# Patient Record
Sex: MALE | Race: WHITE | URBAN - METROPOLITAN AREA
[De-identification: names, ages, dates, MRNs, and addresses within clinical notes are randomized per-mention and may not be internally consistent; named-entity substitution may affect disease eponyms.]

---

## 2023-03-08 ENCOUNTER — APPOINTMENT (RX ONLY)
Dept: URBAN - METROPOLITAN AREA CLINIC 328 | Facility: CLINIC | Age: 43
Setting detail: DERMATOLOGY
End: 2023-03-08

## 2023-03-08 DIAGNOSIS — L30.4 ERYTHEMA INTERTRIGO: ICD-10-CM

## 2023-03-08 DIAGNOSIS — A63.0 ANOGENITAL (VENEREAL) WARTS: ICD-10-CM

## 2023-03-08 PROCEDURE — ? COUNSELING

## 2023-03-08 PROCEDURE — ? PRESCRIPTION

## 2023-03-08 PROCEDURE — ? PRESCRIPTION MEDICATION MANAGEMENT

## 2023-03-08 PROCEDURE — 99203 OFFICE O/P NEW LOW 30 MIN: CPT | Mod: 25

## 2023-03-08 PROCEDURE — 17110 DESTRUCTION B9 LES UP TO 14: CPT

## 2023-03-08 PROCEDURE — ? BENIGN DESTRUCTION

## 2023-03-08 RX ORDER — HYDROCORTISONE 25 MG/G
CREAM TOPICAL
Qty: 30 | Refills: 0 | Status: ERX | COMMUNITY
Start: 2023-03-08

## 2023-03-08 RX ORDER — ECONAZOLE NITRATE 10 MG/G
CREAM TOPICAL
Qty: 85 | Refills: 3 | Status: ERX | COMMUNITY
Start: 2023-03-08

## 2023-03-08 RX ADMIN — HYDROCORTISONE: 25 CREAM TOPICAL at 00:00

## 2023-03-08 RX ADMIN — ECONAZOLE NITRATE: 10 CREAM TOPICAL at 00:00

## 2023-03-08 ASSESSMENT — LOCATION SIMPLE DESCRIPTION DERM
LOCATION SIMPLE: PENIS
LOCATION SIMPLE: RIGHT AXILLARY VAULT
LOCATION SIMPLE: SCROTUM
LOCATION SIMPLE: GROIN
LOCATION SIMPLE: LEFT AXILLARY VAULT

## 2023-03-08 ASSESSMENT — LOCATION ZONE DERM
LOCATION ZONE: PENIS
LOCATION ZONE: GENITALIA
LOCATION ZONE: AXILLAE
LOCATION ZONE: TRUNK

## 2023-03-08 ASSESSMENT — TOTAL NUMBER OF CONDYLOMA: # OF LESIONS?: 3

## 2023-03-08 ASSESSMENT — LOCATION DETAILED DESCRIPTION DERM
LOCATION DETAILED: LEFT AXILLARY VAULT
LOCATION DETAILED: RIGHT AXILLARY VAULT
LOCATION DETAILED: BASE OF THE PENIS
LOCATION DETAILED: LEFT ANTERIOR SCROTUM
LOCATION DETAILED: SUPRAPUBIC SKIN

## 2023-03-08 NOTE — PROCEDURE: PRESCRIPTION MEDICATION MANAGEMENT
Initiate Treatment: Econozole bid for two weeks \\nHydrocortisone bid Monday , Wednesday,Friday
Detail Level: Zone
Render In Strict Bullet Format?: No

## 2023-03-08 NOTE — PROCEDURE: BENIGN DESTRUCTION
Render Note In Bullet Format When Appropriate: No
Detail Level: Detailed
Medical Necessity Information: It is in your best interest to select a reason for this procedure from the list below. All of these items fulfill various CMS LCD requirements except the new and changing color options.
Medical Necessity Clause: This procedure was medically necessary because the lesions that were treated were:
Consent: The patient's consent was obtained including but not limited to risks of crusting, scabbing, blistering, scarring, darker or lighter pigmentary change, recurrence, incomplete removal and infection.
Post-Care Instructions: I reviewed with the patient in detail post-care instructions. Patient is to wear sunprotection, and avoid picking at any of the treated lesions. Pt may apply Vaseline to crusted or scabbing areas.
Anesthesia Volume In Cc: 0.5
Treatment Number (Will Not Render If 0): 0

## 2023-03-22 ENCOUNTER — APPOINTMENT (RX ONLY)
Dept: URBAN - METROPOLITAN AREA CLINIC 334 | Facility: CLINIC | Age: 43
Setting detail: DERMATOLOGY
End: 2023-03-22

## 2023-03-22 DIAGNOSIS — A63.0 ANOGENITAL (VENEREAL) WARTS: ICD-10-CM | Status: RESOLVING

## 2023-03-22 DIAGNOSIS — L30.4 ERYTHEMA INTERTRIGO: ICD-10-CM | Status: RESOLVING

## 2023-03-22 PROCEDURE — ? PRESCRIPTION MEDICATION MANAGEMENT

## 2023-03-22 PROCEDURE — 99213 OFFICE O/P EST LOW 20 MIN: CPT

## 2023-03-22 PROCEDURE — ? COUNSELING

## 2023-03-22 PROCEDURE — ? IN-HOUSE DISPENSING PHARMACY

## 2023-03-22 ASSESSMENT — LOCATION ZONE DERM
LOCATION ZONE: PENIS
LOCATION ZONE: PENIS
LOCATION ZONE: AXILLAE

## 2023-03-22 ASSESSMENT — LOCATION SIMPLE DESCRIPTION DERM
LOCATION SIMPLE: LEFT AXILLARY VAULT
LOCATION SIMPLE: PENIS
LOCATION SIMPLE: RIGHT AXILLARY VAULT
LOCATION SIMPLE: PENIS

## 2023-03-22 ASSESSMENT — LOCATION DETAILED DESCRIPTION DERM
LOCATION DETAILED: LEFT AXILLARY VAULT
LOCATION DETAILED: DORSAL GLANS
LOCATION DETAILED: DORSAL GLANS
LOCATION DETAILED: RIGHT AXILLARY VAULT

## 2023-03-22 NOTE — PROCEDURE: IN-HOUSE DISPENSING PHARMACY
Product 20 Units Dispensed: 0
Product 75 Unit Type: mg
Render Product Pricing In Note: Yes
Product 13 Amount/Unit (Numbers Only): 1
Product 5 Application Directions: apply to face at night
Name Of Product 10: Fungal Dermatitis Cream (hydro/Keto)
Name Of Product 16: Dermatitis Topical Solution (Clobetasol) #16
Product 5 Unit Type: bottle(s)
Product 14 [place]/Unit (In Dollars): 45
Product 8 Price/Unit (In Dollars): 35
Name Of Product 1: Nail Fungal Solution
Product 3 Price/Unit (In Dollars): 50
Product 10 Application Directions: apply to affected area 3 times per week
Name Of Product 6: AK gel #5
Name Of Product 12: Melasma Emulsion
Product 14 Refills: 3
Product 8 Refills: 2
Product 3 Refills: 4
Product 16 Application Directions: mix entire bottle into a large jar of Cerave Cream OTC and apply to affected area bid
Product 4 Price/Unit (In Dollars): 40
Product 6 Application Directions: apply to affected area 3x per week
Product 1 Application Directions: apply to nails twice a day
Product 12 Application Directions: apply to face nightly for two months then two months off
Product 11 Price/Unit (In Dollars): 20
Product 4 Application Directions: apply to face and body at night
Name Of Product 9: Dermatitis Cream $9 (Clobetasol)
Name Of Product 15: Antifungal Shampoo $15
Product 7 Price/Unit (In Dollars): 55
Detail Level: Zone
Product 15 Application Directions: apply to scalp 3 x per week
Product 9 Application Directions: apply to affected area twice a day for two weeks on and two weeks off
Name Of Product 5: Acne Gel $4 (adapalene/bpo)
Product 17 Amount/Unit (Numbers Only): 120
Name Of Product 8: Antifungal Cream #8 (Econazole)
Name Of Product 3: Acne Moisturizing Cream #2
Name Of Product 14: Rosacea Silicone Gel #14
Name Of Product 4: Acne Gel Combo #3 (BPO/Clinda)
Product 12 Price/Unit (In Dollars): 60
Product 14 Application Directions: apply to face daily
Product 8 Application Directions: apply to affected area twice a day
Name Of Product 17: Xerosis Gel $17
Name Of Product 11: Antibacterial Ointment # 11
Name Of Product 13: Rosacea Cream (azeleic acid) #13
Product 15 Price/Unit (In Dollars): 30
Name Of Product 2: Acne Gel #1
Name Of Product 7: #6 Alopecia  Topical Solution
Product 17 Application Directions: apply to affected area daily
Product 11 Application Directions: apply to affected area bid for 10 days
Product 17 Unit Type: ml
Product 13 Application Directions: apply to affected bid
Product 7 Application Directions: apply 2-3 drops to scalp 3 times per week

## 2023-03-22 NOTE — PROCEDURE: PRESCRIPTION MEDICATION MANAGEMENT
Detail Level: Zone
Render In Strict Bullet Format?: No
Continue Regimen: Econozole bid for two weeks \\nHydrocortisone bid Monday , Wednesday,Friday

## 2023-08-07 RX ORDER — HYDROCORTISONE 25 MG/G
CREAM TOPICAL
Qty: 30 | Refills: 0 | Status: ERX